# Patient Record
Sex: FEMALE | Race: OTHER | Employment: UNEMPLOYED | URBAN - METROPOLITAN AREA
[De-identification: names, ages, dates, MRNs, and addresses within clinical notes are randomized per-mention and may not be internally consistent; named-entity substitution may affect disease eponyms.]

---

## 2021-06-20 ENCOUNTER — HOSPITAL ENCOUNTER (OUTPATIENT)
Dept: ULTRASOUND IMAGING | Age: 46
Discharge: HOME | End: 2021-06-22
Payer: COMMERCIAL

## 2021-06-20 DIAGNOSIS — R22.9 LOCALIZED SUPERFICIAL SWELLING, MASS, OR LUMP: ICD-10-CM

## 2021-06-20 PROCEDURE — 76999 ECHO EXAMINATION PROCEDURE: CPT

## 2021-07-13 ENCOUNTER — HOSPITAL ENCOUNTER (OUTPATIENT)
Dept: WOMENS IMAGING | Age: 46
Discharge: HOME | End: 2021-07-15
Payer: COMMERCIAL

## 2021-07-13 ENCOUNTER — OFFICE VISIT (OUTPATIENT)
Dept: ORTHOPEDIC SURGERY | Age: 46
End: 2021-07-13
Payer: COMMERCIAL

## 2021-07-13 VITALS
BODY MASS INDEX: 31.58 KG/M2 | TEMPERATURE: 97.5 F | HEART RATE: 77 BPM | OXYGEN SATURATION: 99 % | WEIGHT: 185 LBS | HEIGHT: 64 IN

## 2021-07-13 DIAGNOSIS — Z12.31 ENCOUNTER FOR SCREENING MAMMOGRAM FOR MALIGNANT NEOPLASM OF BREAST: ICD-10-CM

## 2021-07-13 DIAGNOSIS — M67.431 GANGLION CYST OF VOLAR ASPECT OF RIGHT WRIST: Primary | ICD-10-CM

## 2021-07-13 PROCEDURE — 99203 OFFICE O/P NEW LOW 30 MIN: CPT | Performed by: ORTHOPAEDIC SURGERY

## 2021-07-13 PROCEDURE — G8417 CALC BMI ABV UP PARAM F/U: HCPCS | Performed by: ORTHOPAEDIC SURGERY

## 2021-07-13 PROCEDURE — 77067 SCR MAMMO BI INCL CAD: CPT

## 2021-07-13 PROCEDURE — 20612 ASPIRATE/INJ GANGLION CYST: CPT | Performed by: ORTHOPAEDIC SURGERY

## 2021-07-13 PROCEDURE — G8427 DOCREV CUR MEDS BY ELIG CLIN: HCPCS | Performed by: ORTHOPAEDIC SURGERY

## 2021-07-13 PROCEDURE — 4004F PT TOBACCO SCREEN RCVD TLK: CPT | Performed by: ORTHOPAEDIC SURGERY

## 2021-07-13 RX ORDER — MELOXICAM 15 MG/1
TABLET ORAL
COMMUNITY
Start: 2021-07-06

## 2021-07-13 NOTE — PROGRESS NOTES
Subjective:      Patient ID: Carol Holloway is a 42 y.o. female who presents today for:  Chief Complaint   Patient presents with    Ganglion Cyst     NP-ganglion cyst of right wrist present for 4-5 months, painful per patient, pt is right hand dominant, no injury reported; referred by [facility]; no xrays obtained       HPI  Describes the cyst has gotten bigger  And tender feels with her work, she works as a     No past medical history on file. No past surgical history on file. Social History     Socioeconomic History    Marital status: Unknown     Spouse name: Not on file    Number of children: Not on file    Years of education: Not on file    Highest education level: Not on file   Occupational History    Not on file   Tobacco Use    Smoking status: Current Every Day Smoker     Types: Cigarettes    Smokeless tobacco: Never Used   Substance and Sexual Activity    Alcohol use: Not on file    Drug use: Not on file    Sexual activity: Not on file   Other Topics Concern    Not on file   Social History Narrative    Not on file     Social Determinants of Health     Financial Resource Strain:     Difficulty of Paying Living Expenses:    Food Insecurity:     Worried About Running Out of Food in the Last Year:     [address] in the Last Year:    Transportation Needs:     Lack of Transportation (Medical):      Lack of Transportation (Non-Medical):    Physical Activity:     Days of Exercise per Week:     Minutes of Exercise per Session:    Stress:     Feeling of Stress :    Social Connections:     Frequency of Communication with Friends and Family:     Frequency of Social Gatherings with Friends and Family:     Attends Confucianism Services:     Active Member of Clubs or Organizations:     Attends Club or Organization Meetings:     Marital Status:    Intimate Partner Violence:     Fear of Current or Ex-Partner:     Emotionally Abused:     Physically Abused:     Sexually Abused: No family history on file. No Known Allergies  Current Outpatient Medications on File Prior to Visit   Medication Sig Dispense Refill    meloxicam (MOBIC) 15 MG tablet take 1 tablet by mouth once daily       No current facility-administered medications on file prior to visit. Controlled Substance Monitoring:    Acute and Chronic Pain Monitoring:   No flowsheet data found. Review of Systems    Objective:   Pulse 77   Temp 97.5 °F (36.4 °C) (Temporal)   Ht 5' 4\" (1.626 m)   Wt 185 lb (83.9 kg)   LMP 06/20/2021   SpO2 99%   BMI 31.76 kg/m²     Physical Exam:  Right wrist  Swelling present on the volar aspect of the wrist  There is along the radial border and measures approximately 2 cm in length and 1 and half centimeters in width  It is firm to hard in consistency  The radial vessel is ulnar to the cyst  Gera's test is positive for good circulation from the ulnar artery  The cyst is nonpulsatile  Tinel sign is negative wrist motion causes a mild discomfort over the region of the cyst and makes it more prominent on dorsiflexion  No discomfort along the carpal canal area  She has full range of motion of the fingers and the thumb    Diagnostic Imaging:  No x-rays of the wrist were taken      Assessment:       Diagnosis Orders   1.  Ganglion cyst of volar aspect of right wrist  [address]12 - PR ASPIRAT/INJECTION GANGLION CYST(S)         Plan:      Discussed various treatment options for this ganglion cyst  Conservative measures such as aspiration, or observation have been discussed  Surgical excision in the form of exploration and removal of the wrist is also been explained  The risk of injury to the radial artery during aspiration has been discussed and the patient understands this    Procedure  The area of the cyst on the volar aspect of the wrist was prepped well with Betadine  Skin freeze was first applied to the cyst  Using 18-gauge needle approximately 1-1/2 cc of clear gelatinous fluid was aspirated

## 2021-07-13 NOTE — LETTER
[address]  [address] [address] [address]  Phone: [phone]  Fax: [phone]    Norman North MD        July 13, 2021     Patient: Pipe West   YOB: 1975   Date of Visit: 7/13/2021       To Whom it May Concern:    Pipe West was seen in my clinic on 7/13/2021. Please excuse Mariposa Arroyothal off work from 7/13/2021-7/19/2021. Mariposa Arango may return to work on 7/19/2021    If you have any questions or concerns, please don't hesitate to call.     Sincerely,         Norman North MD

## 2021-07-27 ENCOUNTER — HOSPITAL ENCOUNTER (OUTPATIENT)
Dept: ULTRASOUND IMAGING | Age: 46
Discharge: HOME | End: 2021-07-29
Payer: COMMERCIAL

## 2021-07-27 ENCOUNTER — HOSPITAL ENCOUNTER (OUTPATIENT)
Dept: WOMENS IMAGING | Age: 46
Discharge: HOME | End: 2021-07-29
Payer: COMMERCIAL

## 2021-07-27 ENCOUNTER — OFFICE VISIT (OUTPATIENT)
Dept: ORTHOPEDIC SURGERY | Age: 46
End: 2021-07-27
Payer: COMMERCIAL

## 2021-07-27 VITALS
HEIGHT: 64 IN | BODY MASS INDEX: 31.58 KG/M2 | HEART RATE: 73 BPM | WEIGHT: 185 LBS | TEMPERATURE: 97.7 F | OXYGEN SATURATION: 99 %

## 2021-07-27 DIAGNOSIS — R92.8 ABNORMAL MAMMOGRAM: ICD-10-CM

## 2021-07-27 DIAGNOSIS — M65.311 TRIGGER THUMB OF RIGHT HAND: Primary | ICD-10-CM

## 2021-07-27 DIAGNOSIS — M67.431 GANGLION CYST OF VOLAR ASPECT OF RIGHT WRIST: ICD-10-CM

## 2021-07-27 PROCEDURE — 4004F PT TOBACCO SCREEN RCVD TLK: CPT | Performed by: ORTHOPAEDIC SURGERY

## 2021-07-27 PROCEDURE — G8417 CALC BMI ABV UP PARAM F/U: HCPCS | Performed by: ORTHOPAEDIC SURGERY

## 2021-07-27 PROCEDURE — G8427 DOCREV CUR MEDS BY ELIG CLIN: HCPCS | Performed by: ORTHOPAEDIC SURGERY

## 2021-07-27 PROCEDURE — 99213 OFFICE O/P EST LOW 20 MIN: CPT | Performed by: ORTHOPAEDIC SURGERY

## 2021-07-27 PROCEDURE — 76642 ULTRASOUND BREAST LIMITED: CPT

## 2021-07-27 PROCEDURE — 77065 DX MAMMO INCL CAD UNI: CPT

## 2021-07-27 NOTE — PROGRESS NOTES
Subjective:      Patient ID: Ramon Feng is a 42 y.o. female who presents today for:  Chief Complaint   Patient presents with    Follow-up     f/u ganglion cyst of right wrist.  Cyst was drained on 07/13/21 and it came back the next day. Is having pain radiating into the thumb. HPI      No past medical history on file. No past surgical history on file. Social History     Socioeconomic History    Marital status: Unknown     Spouse name: Not on file    Number of children: Not on file    Years of education: Not on file    Highest education level: Not on file   Occupational History    Not on file   Tobacco Use    Smoking status: Current Every Day Smoker     Types: Cigarettes    Smokeless tobacco: Never Used   Substance and Sexual Activity    Alcohol use: Not on file    Drug use: Not on file    Sexual activity: Not on file   Other Topics Concern    Not on file   Social History Narrative    Not on file     Social Determinants of Health     Financial Resource Strain:     Difficulty of Paying Living Expenses:    Food Insecurity:     Worried About Running Out of Food in the Last Year:     [address] in the Last Year:    Transportation Needs:     Lack of Transportation (Medical):  Lack of Transportation (Non-Medical):    Physical Activity:     Days of Exercise per Week:     Minutes of Exercise per Session:    Stress:     Feeling of Stress :    Social Connections:     Frequency of Communication with Friends and Family:     Frequency of Social Gatherings with Friends and Family:     Attends [employer] Services:     Active Member of Clubs or Organizations:     Attends Club or Organization Meetings:     Marital Status:    Intimate Partner Violence:     Fear of Current or Ex-Partner:     Emotionally Abused:     Physically Abused:     Sexually Abused:      No family history on file.   No Known Allergies  Current Outpatient Medications on File Prior to Visit   Medication Sig Dispense Refill    meloxicam (MOBIC) 15 MG tablet take 1 tablet by mouth once daily       No current facility-administered medications on file prior to visit. Controlled Substance Monitoring:    Acute and Chronic Pain Monitoring:   No flowsheet data found. Review of Systems    Objective:   Pulse 73   Temp 97.7 °F (36.5 °C) (Temporal)   Ht 5' 4\" (1.626 m)   Wt 185 lb (83.9 kg)   SpO2 99%   BMI 31.76 kg/m²     Physical Exam:  Right wrist  There is a swelling on the volar aspect of the right wrist close to the distal aspect of the radius  It measures about 1-1/2 to 2 cm in length and 1 cm in width  It is soft in consistency  Tinel sign is negative  The radial artery is palpable along the ulnar aspect of the cyst  Sensations are intact in the hand  Ulnar artery perfusion was checked and noted to be satisfactory    Right thumb  Clear triggering is present in the right thumb  Tenderness over the A1 pulley  Sensations are intact in the thumb  No discomfort in the rest of the A1 pulleys    Diagnostic Imaging:  No rays were taken today      Assessment:       Diagnosis Orders   1. Trigger thumb of right hand     2. Ganglion cyst of volar aspect of right wrist       I explained to the patient the treatment options  Aspiration of the cyst did not help her and the cyst recurred  Surgical intervention was discussed, the surgical procedure was explained in detail  The risks and benefits of surgery was explained  Risk of anesthesia, risk of injury to the vessel, particularly the radial vessel, nerve, tendon.   Risk of injury to the flexor tendon in the digital nerve near the thumb  Possible need for rehab or recurrence of the cyst has also been discussed  Postoperative complications such as hematoma formation, infection, recurrence of the cyst, neuroma formation, hematoma formation, have all been explained  She understands all this and consents for the procedure  This will be done in approximately 2 weeks        Plan: Surgery Phone: [facility]   Surgery Fax: [phone]    Phone: [phone]          Fax: [phone]    Orthopedics: Surgery Scheduling, PAT & PRE-OP Order Form  Call to advance [place] at [phone] at least 24 hours prior to date of service     Surgery Location: [facility] Surgery: Σκαφίδια Tiffany Eid MD Surgery Date: 8/10/21  Time: A. m.am    Patient's Name: Amy Baker : 1975    SS#:  [id number]  Gender: female  Home Phone:  [phone] Cell Phone: [phone]  Emergency Contact:  *No Contact Specified*   Phone:   Payor: Ronni Garay /  /  /    ID No.: [id number]      PROVIDER TO COMPLETE:  Diagnosis: Right hand ganglion cyst, right trigger thumb  Procedure/Consent: Excision of ganglion cyst right wrist, release of trigger thumb right  CPT CODES: Trigger thumb right hand, ganglion cyst right wrist  Case Comments/Implants: N/A   Surgery Scheduled as: Outpatient  Anesthesia Requested: General  Referring Family Doctor: Armando Corona DO   PAT  [x] [address] [facility] Date/Time:                                                            [x] History & Physical [] Physician will Provide [] Attached [] Dictated [] Other  [x] Follow Anesthesia Pre-Op Orders for X-rays, [facility]     [x] SN & PT to evaluate and treat/educate disease management, medications, home safety & equipment needs for total joint patients  [] Other: ____________________________________________________  Consults: Medical/Cardiac Clearance done by  ____________________  PRE-OP ORDERS:   Allergies: Patient has no known allergies.  Latex Allergies:             Diabetic:           [] IV ________________________  [x] IV Start with J-loop     Preprinted Orders: Attached [] Yes [] No   ANTIBIOTIC PRE-OP: [x] ANCEF 2 gram IVPB if > 120 kg 3 grams IVPB within 1 hour of incision, if ALLERGIC, use VANCOMYCIN 1 gram IV, 2 hours pre-op  [] TXA Protocol [] Other:   [x] NPO   [] Betablocker (if needed) _____________________________________   [] Knee high anti-embolic hose [] Thigh high anti-embolic hose   Other: ______________________________________________________    Physician Signature Required: Norman North MD   Date/Time: 7/27/2021      No orders of the defined types were placed in this encounter. No orders of the defined types were placed in this encounter. No follow-ups on file.       Norman North MD

## 2021-07-30 ENCOUNTER — TELEPHONE (OUTPATIENT)
Dept: ORTHOPEDIC SURGERY | Age: 46
End: 2021-07-30

## 2021-07-30 NOTE — TELEPHONE ENCOUNTER
Shayne Jean-Baptiste has initiated a prior authorization through patients insurance Payor: Jerson Church /  /  /  for surgery with  on 8/10/2021. [employer] has stated No authorization needed for CPT codes 09957,69897.     Surgery sheet faxed to surgery scheduling, provider calendar updated

## 2021-08-03 ENCOUNTER — TELEPHONE (OUTPATIENT)
Dept: ORTHOPEDIC SURGERY | Age: 46
End: 2021-08-03

## 2021-08-03 NOTE — TELEPHONE ENCOUNTER
Padmini HINKLE  Insurance stated no prior authorization is required for surgery with Dr. Bob Beck on 08/10/2021             Insurance approved cpt code 94284,[id number]  REF # [id number]

## 2021-08-09 ENCOUNTER — OFFICE VISIT (OUTPATIENT)
Dept: ORTHOPEDIC SURGERY | Age: 46
End: 2021-08-09
Payer: COMMERCIAL

## 2021-08-09 VITALS
HEART RATE: 75 BPM | DIASTOLIC BLOOD PRESSURE: 89 MMHG | RESPIRATION RATE: 16 BRPM | BODY MASS INDEX: 31.58 KG/M2 | SYSTOLIC BLOOD PRESSURE: 123 MMHG | WEIGHT: 185 LBS | TEMPERATURE: 97.3 F | HEIGHT: 64 IN | OXYGEN SATURATION: 97 %

## 2021-08-09 DIAGNOSIS — Z01.818 PRE-OP TESTING: Primary | ICD-10-CM

## 2021-08-09 PROCEDURE — 99406 BEHAV CHNG SMOKING 3-10 MIN: CPT | Performed by: PHYSICIAN ASSISTANT

## 2021-08-09 PROCEDURE — 4004F PT TOBACCO SCREEN RCVD TLK: CPT | Performed by: PHYSICIAN ASSISTANT

## 2021-08-09 PROCEDURE — G8417 CALC BMI ABV UP PARAM F/U: HCPCS | Performed by: PHYSICIAN ASSISTANT

## 2021-08-09 PROCEDURE — 99213 OFFICE O/P EST LOW 20 MIN: CPT | Performed by: PHYSICIAN ASSISTANT

## 2021-08-09 PROCEDURE — G8427 DOCREV CUR MEDS BY ELIG CLIN: HCPCS | Performed by: PHYSICIAN ASSISTANT

## 2021-08-09 NOTE — LETTER
[address] [address]  Phone: [phone]  Fax: [phone]    Lucero Colon PA-C        August 9, 2021     Patient: Mary Watters   YOB: 1975   Date of Visit: 8/9/2021       To Whom It May Concern: It is my medical opinion that Mary Watters should be off work until 8/30. If you have any questions or concerns, please don't hesitate to call.     Sincerely,          Lucero Colon PA-C

## 2021-08-09 NOTE — PROGRESS NOTES
[place] [address] and Sports Medicine    H&P: Preadmission Testing     Patient: Farideh Brody  YOB: 1975  MRN: [id number]    Subjective:     Chief Complaint   Patient presents with    Pre-op Exam     Pt is schedueld for EXCISION OF GANGLION CYST RIGHT WRIST, EXCISION OF GANGLION CYST RIGHT WRIST, - Right with Selina Berger MD on 8/10/2021       HPI: Farideh Brody is a 42 y.o. female w/ no pertinent PMHx  Here for preop evaluation for cyst removal at the right wrist as well as trigger at the right thumb. She has known history of heart disease. No chest pain palpitations shortness of breath difficulty with exercise. No lung disease. She is a smoker, we talked about the negative consequences of that both long-term and short-term for 4 minutes. She has no wheezing. She has never had Covid, she is fully vaccinated. She has no diabetes. Standing blood thinners. No kidney disease. She is a very healthy 29-year-old decently English-speaking woman  Past Medical History:    No past medical history on file. Past Surgical History:    No past surgical history on file. Medications Prior to Admission:    Current Outpatient Medications   Medication Sig Dispense Refill    meloxicam (MOBIC) 15 MG tablet take 1 tablet by mouth once daily (Patient not taking: Reported on 8/9/2021)       No current facility-administered medications for this visit. Allergies:    Patient has no known allergies.     Social History:   Social History     Socioeconomic History    Marital status: Unknown     Spouse name: Not on file    Number of children: Not on file    Years of education: Not on file    Highest education level: Not on file   Occupational History    Not on file   Tobacco Use    Smoking status: Current Every Day Smoker     Types: Cigarettes    Smokeless tobacco: Never Used   Substance and Sexual Activity    Alcohol use: Not on file    Drug use: Not on file    Sexual activity: Not on file   Other Topics Concern    Not on file   Social History Narrative    Not on file     Social Determinants of Health     Financial Resource Strain:     Difficulty of Paying Living Expenses:    Food Insecurity:     Worried About Running Out of Food in the Last Year:     [address] in the Last Year:    Transportation Needs:     Lack of Transportation (Medical):  Lack of Transportation (Non-Medical):    Physical Activity:     Days of Exercise per Week:     Minutes of Exercise per Session:    Stress:     Feeling of Stress :    Social Connections:     Frequency of Communication with Friends and Family:     Frequency of Social Gatherings with Friends and Family:     Attends [employer] Services:     Active Member of Clubs or Organizations:     Attends Club or Organization Meetings:     Marital Status:    Intimate Partner Violence:     Fear of Current or Ex-Partner:     Emotionally Abused:     Physically Abused:     Sexually Abused:        Family History:   No family history on file. Objective:   /89 (Site: Right Upper Arm, Position: Sitting, Cuff Size: Medium Adult)   Pulse 75   Temp 97.3 °F (36.3 °C) (Temporal)   Resp 16   Ht 5' 4\" (1.626 m)   Wt 185 lb (83.9 kg)   SpO2 97%   BMI 31.76 kg/m²     Physical Exam  Constitutional:       General: She is not in acute distress. Appearance: Normal appearance. She is not ill-appearing. HENT:      Head: Normocephalic. Nose: Nose normal. No congestion or rhinorrhea. Mouth/Throat:      Mouth: Mucous membranes are moist.      Pharynx: Oropharynx is clear. No oropharyngeal exudate or posterior oropharyngeal erythema. Eyes:      Extraocular Movements: Extraocular movements intact. Pupils: Pupils are equal, round, and reactive to light. Cardiovascular:      Rate and Rhythm: Normal rate and regular rhythm. Pulses: Normal pulses. Heart sounds: Normal heart sounds.    Pulmonary: Effort: Pulmonary effort is normal.      Breath sounds: Normal breath sounds. No wheezing, rhonchi or rales. Abdominal:      General: Abdomen is flat. Bowel sounds are normal.      Palpations: Abdomen is soft. Tenderness: There is no abdominal tenderness. Skin:     General: Skin is warm and dry. Capillary Refill: Capillary refill takes less than 2 seconds. Comments: No swelling or erythema over the incision site   Neurological:      General: No focal deficit present. Mental Status: She is alert and oriented to person, place, and time. Radiographs and Laboratory Studies:     Laboratory Studies:   No results found for: WBC, HGB, HCT, MCV, PLT  No results found for: SEDRATE  No results found for: CRP    Assessment and Plan:      Diagnosis Orders   1. Pre-op testing  CBC    Basic Metabolic Panel       Fully vaccinated  Blood work was ordered and will be reviewed. I'll see them back 2 weeks postoperatively.     Becky Orlando PA-C  [address] and Sports Medicine  [phone]

## 2021-08-10 ENCOUNTER — ANESTHESIA (OUTPATIENT)
Dept: OPERATING ROOM | Age: 46
End: 2021-08-10
Payer: COMMERCIAL

## 2021-08-10 ENCOUNTER — HOSPITAL ENCOUNTER (OUTPATIENT)
Age: 46
Setting detail: OUTPATIENT SURGERY
Discharge: HOME | End: 2021-08-10
Attending: ORTHOPAEDIC SURGERY | Admitting: ORTHOPAEDIC SURGERY
Payer: COMMERCIAL

## 2021-08-10 ENCOUNTER — ANESTHESIA EVENT (OUTPATIENT)
Dept: OPERATING ROOM | Age: 46
End: 2021-08-10
Payer: COMMERCIAL

## 2021-08-10 VITALS
RESPIRATION RATE: 12 BRPM | OXYGEN SATURATION: 98 % | HEIGHT: 64 IN | TEMPERATURE: 97.3 F | HEART RATE: 68 BPM | WEIGHT: 186 LBS | SYSTOLIC BLOOD PRESSURE: 120 MMHG | DIASTOLIC BLOOD PRESSURE: 77 MMHG | BODY MASS INDEX: 31.76 KG/M2

## 2021-08-10 VITALS — DIASTOLIC BLOOD PRESSURE: 55 MMHG | OXYGEN SATURATION: 96 % | TEMPERATURE: 32.2 F | SYSTOLIC BLOOD PRESSURE: 100 MMHG

## 2021-08-10 PROCEDURE — 2709999900 HC NON-CHARGEABLE SUPPLY: Performed by: ORTHOPAEDIC SURGERY

## 2021-08-10 PROCEDURE — 7100000011 HC PHASE II RECOVERY - ADDTL 15 MIN: Performed by: ORTHOPAEDIC SURGERY

## 2021-08-10 PROCEDURE — 3600000003 HC SURGERY LEVEL 3 BASE: Performed by: ORTHOPAEDIC SURGERY

## 2021-08-10 PROCEDURE — 6360000002 HC RX W HCPCS: Performed by: NURSE ANESTHETIST, CERTIFIED REGISTERED

## 2021-08-10 PROCEDURE — 6370000000 HC RX 637 (ALT 250 FOR IP): Performed by: ANESTHESIOLOGY

## 2021-08-10 PROCEDURE — 6360000002 HC RX W HCPCS: Performed by: ORTHOPAEDIC SURGERY

## 2021-08-10 PROCEDURE — 7100000000 HC PACU RECOVERY - FIRST 15 MIN: Performed by: ORTHOPAEDIC SURGERY

## 2021-08-10 PROCEDURE — 7100000001 HC PACU RECOVERY - ADDTL 15 MIN: Performed by: ORTHOPAEDIC SURGERY

## 2021-08-10 PROCEDURE — 2500000003 HC RX 250 WO HCPCS: Performed by: ORTHOPAEDIC SURGERY

## 2021-08-10 PROCEDURE — 2580000003 HC RX 258

## 2021-08-10 PROCEDURE — 7100000010 HC PHASE II RECOVERY - FIRST 15 MIN: Performed by: ORTHOPAEDIC SURGERY

## 2021-08-10 PROCEDURE — 88304 TISSUE EXAM BY PATHOLOGIST: CPT

## 2021-08-10 PROCEDURE — 2580000003 HC RX 258: Performed by: NURSE ANESTHETIST, CERTIFIED REGISTERED

## 2021-08-10 PROCEDURE — 25111 REMOVE WRIST TENDON LESION: CPT | Performed by: ORTHOPAEDIC SURGERY

## 2021-08-10 PROCEDURE — 3600000013 HC SURGERY LEVEL 3 ADDTL 15MIN: Performed by: ORTHOPAEDIC SURGERY

## 2021-08-10 PROCEDURE — 3700000000 HC ANESTHESIA ATTENDED CARE: Performed by: ORTHOPAEDIC SURGERY

## 2021-08-10 PROCEDURE — 26055 INCISE FINGER TENDON SHEATH: CPT | Performed by: ORTHOPAEDIC SURGERY

## 2021-08-10 PROCEDURE — 2580000003 HC RX 258: Performed by: ORTHOPAEDIC SURGERY

## 2021-08-10 PROCEDURE — 3700000001 HC ADD 15 MINUTES (ANESTHESIA): Performed by: ORTHOPAEDIC SURGERY

## 2021-08-10 RX ORDER — SODIUM CHLORIDE, SODIUM LACTATE, POTASSIUM CHLORIDE, CALCIUM CHLORIDE 600; 310; 30; 20 MG/100ML; MG/100ML; MG/100ML; MG/100ML
INJECTION, SOLUTION INTRAVENOUS
Status: COMPLETED
Start: 2021-08-10 | End: 2021-08-10

## 2021-08-10 RX ORDER — SODIUM CHLORIDE, SODIUM LACTATE, POTASSIUM CHLORIDE, CALCIUM CHLORIDE 600; 310; 30; 20 MG/100ML; MG/100ML; MG/100ML; MG/100ML
INJECTION, SOLUTION INTRAVENOUS CONTINUOUS PRN
Status: DISCONTINUED | OUTPATIENT
Start: 2021-08-10 | End: 2021-08-10 | Stop reason: SDUPTHER

## 2021-08-10 RX ORDER — SODIUM CHLORIDE 9 MG/ML
25 INJECTION, SOLUTION INTRAVENOUS PRN
Status: DISCONTINUED | OUTPATIENT
Start: 2021-08-10 | End: 2021-08-10 | Stop reason: HOSPADM

## 2021-08-10 RX ORDER — ONDANSETRON 2 MG/ML
INJECTION INTRAMUSCULAR; INTRAVENOUS PRN
Status: DISCONTINUED | OUTPATIENT
Start: 2021-08-10 | End: 2021-08-10 | Stop reason: SDUPTHER

## 2021-08-10 RX ORDER — BUPIVACAINE HYDROCHLORIDE 2.5 MG/ML
INJECTION, SOLUTION EPIDURAL; INFILTRATION; INTRACAUDAL PRN
Status: DISCONTINUED | OUTPATIENT
Start: 2021-08-10 | End: 2021-08-10 | Stop reason: ALTCHOICE

## 2021-08-10 RX ORDER — DEXAMETHASONE SODIUM PHOSPHATE 4 MG/ML
INJECTION, SOLUTION INTRA-ARTICULAR; INTRALESIONAL; INTRAMUSCULAR; INTRAVENOUS; SOFT TISSUE PRN
Status: DISCONTINUED | OUTPATIENT
Start: 2021-08-10 | End: 2021-08-10 | Stop reason: SDUPTHER

## 2021-08-10 RX ORDER — METOCLOPRAMIDE HYDROCHLORIDE 5 MG/ML
10 INJECTION INTRAMUSCULAR; INTRAVENOUS
Status: DISCONTINUED | OUTPATIENT
Start: 2021-08-10 | End: 2021-08-10 | Stop reason: HOSPADM

## 2021-08-10 RX ORDER — LIDOCAINE HYDROCHLORIDE 10 MG/ML
1 INJECTION, SOLUTION EPIDURAL; INFILTRATION; INTRACAUDAL; PERINEURAL
Status: DISCONTINUED | OUTPATIENT
Start: 2021-08-10 | End: 2021-08-10 | Stop reason: HOSPADM

## 2021-08-10 RX ORDER — SODIUM CHLORIDE 0.9 % (FLUSH) 0.9 %
10 SYRINGE (ML) INJECTION PRN
Status: DISCONTINUED | OUTPATIENT
Start: 2021-08-10 | End: 2021-08-10 | Stop reason: HOSPADM

## 2021-08-10 RX ORDER — FENTANYL CITRATE 50 UG/ML
50 INJECTION, SOLUTION INTRAMUSCULAR; INTRAVENOUS EVERY 10 MIN PRN
Status: DISCONTINUED | OUTPATIENT
Start: 2021-08-10 | End: 2021-08-10 | Stop reason: HOSPADM

## 2021-08-10 RX ORDER — PROPOFOL 10 MG/ML
INJECTION, EMULSION INTRAVENOUS PRN
Status: DISCONTINUED | OUTPATIENT
Start: 2021-08-10 | End: 2021-08-10 | Stop reason: SDUPTHER

## 2021-08-10 RX ORDER — MEPERIDINE HYDROCHLORIDE 25 MG/ML
12.5 INJECTION INTRAMUSCULAR; INTRAVENOUS; SUBCUTANEOUS EVERY 5 MIN PRN
Status: DISCONTINUED | OUTPATIENT
Start: 2021-08-10 | End: 2021-08-10 | Stop reason: HOSPADM

## 2021-08-10 RX ORDER — LIDOCAINE HYDROCHLORIDE 20 MG/ML
INJECTION, SOLUTION INTRAVENOUS PRN
Status: DISCONTINUED | OUTPATIENT
Start: 2021-08-10 | End: 2021-08-10 | Stop reason: SDUPTHER

## 2021-08-10 RX ORDER — HYDROCODONE BITARTRATE AND ACETAMINOPHEN 5; 325 MG/1; MG/1
1 TABLET ORAL PRN
Status: COMPLETED | OUTPATIENT
Start: 2021-08-10 | End: 2021-08-10

## 2021-08-10 RX ORDER — FENTANYL CITRATE 50 UG/ML
INJECTION, SOLUTION INTRAMUSCULAR; INTRAVENOUS PRN
Status: DISCONTINUED | OUTPATIENT
Start: 2021-08-10 | End: 2021-08-10 | Stop reason: SDUPTHER

## 2021-08-10 RX ORDER — MIDAZOLAM HYDROCHLORIDE 2 MG/2ML
INJECTION, SOLUTION INTRAMUSCULAR; INTRAVENOUS PRN
Status: DISCONTINUED | OUTPATIENT
Start: 2021-08-10 | End: 2021-08-10 | Stop reason: SDUPTHER

## 2021-08-10 RX ORDER — ONDANSETRON 2 MG/ML
4 INJECTION INTRAMUSCULAR; INTRAVENOUS
Status: DISCONTINUED | OUTPATIENT
Start: 2021-08-10 | End: 2021-08-10 | Stop reason: HOSPADM

## 2021-08-10 RX ORDER — SODIUM CHLORIDE, SODIUM LACTATE, POTASSIUM CHLORIDE, CALCIUM CHLORIDE 600; 310; 30; 20 MG/100ML; MG/100ML; MG/100ML; MG/100ML
INJECTION, SOLUTION INTRAVENOUS CONTINUOUS
Status: DISCONTINUED | OUTPATIENT
Start: 2021-08-10 | End: 2021-08-10 | Stop reason: HOSPADM

## 2021-08-10 RX ORDER — MAGNESIUM HYDROXIDE 1200 MG/15ML
LIQUID ORAL CONTINUOUS PRN
Status: COMPLETED | OUTPATIENT
Start: 2021-08-10 | End: 2021-08-10

## 2021-08-10 RX ORDER — SODIUM CHLORIDE 0.9 % (FLUSH) 0.9 %
10 SYRINGE (ML) INJECTION EVERY 12 HOURS SCHEDULED
Status: DISCONTINUED | OUTPATIENT
Start: 2021-08-10 | End: 2021-08-10 | Stop reason: HOSPADM

## 2021-08-10 RX ORDER — HYDROCODONE BITARTRATE AND ACETAMINOPHEN 5; 325 MG/1; MG/1
2 TABLET ORAL PRN
Status: COMPLETED | OUTPATIENT
Start: 2021-08-10 | End: 2021-08-10

## 2021-08-10 RX ORDER — DIPHENHYDRAMINE HYDROCHLORIDE 50 MG/ML
12.5 INJECTION INTRAMUSCULAR; INTRAVENOUS
Status: DISCONTINUED | OUTPATIENT
Start: 2021-08-10 | End: 2021-08-10 | Stop reason: HOSPADM

## 2021-08-10 RX ADMIN — DEXAMETHASONE SODIUM PHOSPHATE 4 MG: 4 INJECTION, SOLUTION INTRAMUSCULAR; INTRAVENOUS at 10:54

## 2021-08-10 RX ADMIN — MIDAZOLAM HYDROCHLORIDE 2 MG: 1 INJECTION, SOLUTION INTRAMUSCULAR; INTRAVENOUS at 10:31

## 2021-08-10 RX ADMIN — HYDROCODONE BITARTRATE AND ACETAMINOPHEN 2 TABLET: 5; 325 TABLET ORAL at 12:30

## 2021-08-10 RX ADMIN — SODIUM CHLORIDE, POTASSIUM CHLORIDE, SODIUM LACTATE AND CALCIUM CHLORIDE: 600; 310; 30; 20 INJECTION, SOLUTION INTRAVENOUS at 10:31

## 2021-08-10 RX ADMIN — LIDOCAINE HYDROCHLORIDE 50 MG: 20 INJECTION, SOLUTION INTRAVENOUS at 10:35

## 2021-08-10 RX ADMIN — FENTANYL CITRATE 100 MCG: 50 INJECTION, SOLUTION INTRAMUSCULAR; INTRAVENOUS at 10:35

## 2021-08-10 RX ADMIN — CEFAZOLIN 2000 MG: 10 INJECTION, POWDER, FOR SOLUTION INTRAVENOUS at 10:37

## 2021-08-10 RX ADMIN — ONDANSETRON 4 MG: 2 INJECTION INTRAMUSCULAR; INTRAVENOUS at 10:54

## 2021-08-10 RX ADMIN — SODIUM CHLORIDE, POTASSIUM CHLORIDE, SODIUM LACTATE AND CALCIUM CHLORIDE 1000 ML: 600; 310; 30; 20 INJECTION, SOLUTION INTRAVENOUS at 09:36

## 2021-08-10 RX ADMIN — PROPOFOL 200 MG: 10 INJECTION, EMULSION INTRAVENOUS at 10:35

## 2021-08-10 ASSESSMENT — PULMONARY FUNCTION TESTS
PIF_VALUE: 20
PIF_VALUE: 3
PIF_VALUE: 2
PIF_VALUE: 0
PIF_VALUE: 2
PIF_VALUE: 3
PIF_VALUE: 2
PIF_VALUE: 2
PIF_VALUE: 3
PIF_VALUE: 1
PIF_VALUE: 3
PIF_VALUE: 2
PIF_VALUE: 3
PIF_VALUE: 2
PIF_VALUE: 10
PIF_VALUE: 2
PIF_VALUE: 3
PIF_VALUE: 2
PIF_VALUE: 3
PIF_VALUE: 2
PIF_VALUE: 2
PIF_VALUE: 3
PIF_VALUE: 3
PIF_VALUE: 2
PIF_VALUE: 3
PIF_VALUE: 0
PIF_VALUE: 10
PIF_VALUE: 2
PIF_VALUE: 1
PIF_VALUE: 3
PIF_VALUE: 2
PIF_VALUE: 3
PIF_VALUE: 3
PIF_VALUE: 20
PIF_VALUE: 1
PIF_VALUE: 2
PIF_VALUE: 3
PIF_VALUE: 2
PIF_VALUE: 2

## 2021-08-10 ASSESSMENT — PAIN SCALES - GENERAL
PAINLEVEL_OUTOF10: 6
PAINLEVEL_OUTOF10: 5

## 2021-08-10 ASSESSMENT — LIFESTYLE VARIABLES: SMOKING_STATUS: 1

## 2021-08-10 NOTE — OP NOTE
understands all the risks and benefits and consents for the surgical procedure. She clearly understands that the ganglion cyst could recur again in the same area or in a different area. Ganglion cyst once excised will be sent for histological analysis. Operative note    Patient was brought to the operating room and general anesthesia was induced paresthetic services. Patient was positioned supine. All bony prominences well-padded. The patient had 2 g of Ancef given prior to the start of the procedure. Right upper extremity was prepped and draped in the usual sterile fashion. With chlorhexidine tourniquet was applied and elevated to approximately 180 mmHg. A timeout was called prior to the start of the procedure. The surgical procedure was done with magnifying loops    The trigger thumb was examined. A transverse incision was made over the level of the A1 pulley. Skin and subtenons tissue were carefully cut and retracted. Care was taken to avoid any damage to the subcutaneous tissue. The digital nerves were clearly moved out of the way. The procedure was done with magnifying loops. The A1 pulley was identified. And a longitudinal incision was made in the A1 pulley along the direction of the FPL tendon. This was done with a 15 blade. Using blunt tipped scissors A1 pulley was resected and released both proximally and distally. The FPL tendon was moving freely following the release. There was no triggering noted. The volar aspect of the wrist along the distal aspect of the radius was carefully examined. The course of the radial artery was carefully marked out. And this was going ulnar to the ganglion cyst.  A longitudinal incision was made along the course of the ganglion cyst.  Skin and subtenons tissue were carefully cut and retracted. Blunt dissection was done the bluish presence of a ganglion cyst was identified.   The radial artery and along with its vessels were present along the ulnar aspect of the ganglion cyst on the wrist was carefully isolated and retracted using blunt tipped scissors and a nontoothed forceps after the radial vessels were carefully moved out of the way, the ganglion cyst was carefully resected. The contents of the ganglion cyst leaked out during this procedure and this was a clear gelatinous fluid, that usually accompanies a ganglion cyst.  The cyst was carefully teased out from the underlying structures. And care was taken to avoid any damage to tenderness and subcutaneous nerves. The radial vessels being held well out of the way. The cyst was removed in its entirety. A small stalk which was going deep into the soft tissues was carefully isolated and cauterized. The entire cyst was removed. And this was sent for histological analysis. The radial vessels had been isolated completely and were noted to be continuous and intact. There was no damage to the radial vessels noted at the completion of the cyst excision. Surgical site was irrigated out once again the skin was closed Monocryl and the skin over these trigger thumb was closed with nylon. A volar splint was applied. The patient made a satisfactory recovery. She will be evaluated in orthopedic office in approximately 10 days. Instructions will be provided for her postoperative care.           Electronically signed by Shyann Graff MD on 8/10/2021 at 11:16 AM

## 2021-08-10 NOTE — ANESTHESIA POSTPROCEDURE EVALUATION
Department of Anesthesiology  Postprocedure Note    Patient: Mami Rand  MRN: [id number]  YOB: 1975  Date of evaluation: 8/10/2021  Time:  11:31 AM     Procedure Summary     Date: 08/10/21 Room / Location: [address]    Anesthesia Start: 1031 Anesthesia Stop: 0014    Procedures:       EXCISION OF GANGLION CYST RIGHT WRIST, (Right )      RELEASE OF TRIGGER THUMB RIGHT , PAT WITH ELI IN OFFICE (Right ) Diagnosis: (RIGHT HAND GANGLION CYST RIGHT TRIGGER THUMB)    Surgeons: Emani Castañeda MD Responsible Provider: Shaw Magana MD    Anesthesia Type: general ASA Status: 2          Anesthesia Type: general    Kylah Phase I: Kylah Score: 10    Kylah Phase II:      Last vitals: Reviewed and per EMR flowsheets.        Anesthesia Post Evaluation    Patient location during evaluation: PACU  Patient participation: complete - patient participated  Level of consciousness: responsive to verbal stimuli  Pain score: 0  Airway patency: patent  Nausea & Vomiting: no nausea and no vomiting  Complications: no  Cardiovascular status: blood pressure returned to baseline and hemodynamically stable  Respiratory status: acceptable and nasal cannula  Hydration status: stable

## 2021-08-10 NOTE — ANESTHESIA PRE PROCEDURE
Department of Anesthesiology  Preprocedure Note       Name:  Moi Mean   Age:  42 y.o.  :  1975                                          MRN:  [id number]         Date:  8/10/2021      Surgeon: Jessica Muniz): Frank Ortiz MD    Procedure: Procedure(s):  EXCISION OF GANGLION CYST RIGHT WRIST,  RELEASE OF TRIGGER THUMB RIGHT , PAT WITH ELI IN OFFICE    Medications prior to admission:   Prior to Admission medications    Medication Sig Start Date End Date Taking? Authorizing Provider   meloxicam (MOBIC) 15 MG tablet take 1 tablet by mouth once daily 21  Yes Historical Provider, MD       Current medications:    Current Facility-Administered Medications   Medication Dose Route Frequency Provider Last Rate Last Admin    ceFAZolin (ANCEF) 2000 mg in dextrose 5 % 100 mL IVPB  2,000 mg Intravenous On Call to [address] [facility], MD        lactated ringers infusion                Allergies:  No Known Allergies    Problem List:  There is no problem list on file for this patient. Past Medical History:  No past medical history on file. Past Surgical History:  No past surgical history on file. Social History:    Social History     Tobacco Use    Smoking status: Current Every Day Smoker     Types: Cigarettes    Smokeless tobacco: Never Used   Substance Use Topics    Alcohol use: Not on file                                Ready to quit: Not Answered  Counseling given: Not Answered      Vital Signs (Current): There were no vitals filed for this visit.                                            BP Readings from Last 3 Encounters:   21 123/89       NPO Status: Time of last liquid consumption: 2200                        Time of last solid consumption: 0                        Date of last liquid consumption: 21                        Date of last solid food consumption: 21    BMI:   Wt Readings from Last 3 Encounters:   21 185 lb (83.9 kg)   21 185 lb (83.9 kg)   07/13/21 185 lb (83.9 kg)     There is no height or weight on file to calculate BMI.    CBC:   Lab Results   Component Value Date    WBC 6.1 08/09/2021    RBC 4.46 08/09/2021    HGB 14.5 08/09/2021    HCT 42.7 08/09/2021    MCV 95.8 08/09/2021    RDW 13.6 08/09/2021     08/09/2021       CMP:   Lab Results   Component Value Date     08/09/2021    K 4.5 08/09/2021     08/09/2021    CO2 28 08/09/2021    BUN 10 08/09/2021    CREATININE 0.59 08/09/2021    GFRAA >60.0 08/09/2021    LABGLOM >60.0 08/09/2021    GLUCOSE 137 08/09/2021    CALCIUM 8.8 08/09/2021       POC Tests: No results for input(s): POCGLU, POCNA, POCK, POCCL, POCBUN, POCHEMO, POCHCT in the last 72 hours. Coags: No results found for: PROTIME, INR, APTT    HCG (If Applicable):   Lab Results   Component Value Date    PREGTESTUR Negative 08/09/2021        ABGs: No results found for: PHART, PO2ART, ZPL0BBF, QOP2UUK, BEART, H7HVRWFX     Type & Screen (If Applicable):  No results found for: LABABO, LABRH    Drug/Infectious Status (If Applicable):  No results found for: HIV, HEPCAB    COVID-19 Screening (If Applicable): No results found for: COVID19        Anesthesia Evaluation  Patient summary reviewed and Nursing notes reviewed no history of anesthetic complications:   Airway: Mallampati: II  TM distance: >3 FB   Neck ROM: full  Mouth opening: > = 3 FB Dental: normal exam         Pulmonary:   (+) current smoker                           Cardiovascular:Negative CV ROS             Beta Blocker:  Not on Beta Blocker         Neuro/Psych:   Negative Neuro/Psych ROS              GI/Hepatic/Renal:   (+) morbid obesity (class I obesity)          Endo/Other: Negative Endo/Other ROS                    Abdominal:             Vascular: negative vascular ROS. Other Findings:             Anesthesia Plan      general     ASA 2       Induction: intravenous.     MIPS: Postoperative opioids intended and Prophylactic antiemetics administered. Anesthetic plan and risks discussed with patient. Plan discussed with CRNA.     Attending anesthesiologist reviewed and agrees with Josse Wong MD   8/10/2021

## 2021-08-10 NOTE — PROGRESS NOTES
1130-She came into the pacu with an oral airway in place and that was removed upon arrival.  IV infusing into the left hand with vero-400

## 2021-08-17 ENCOUNTER — OFFICE VISIT (OUTPATIENT)
Dept: FAMILY MEDICINE CLINIC | Age: 46
End: 2021-08-17
Payer: COMMERCIAL

## 2021-08-17 VITALS
WEIGHT: 186 LBS | HEART RATE: 83 BPM | OXYGEN SATURATION: 96 % | SYSTOLIC BLOOD PRESSURE: 118 MMHG | DIASTOLIC BLOOD PRESSURE: 76 MMHG | HEIGHT: 64 IN | TEMPERATURE: 97.6 F | BODY MASS INDEX: 31.76 KG/M2

## 2021-08-17 DIAGNOSIS — Z01.818 PRE-OP EXAMINATION: Primary | ICD-10-CM

## 2021-08-17 PROCEDURE — G8417 CALC BMI ABV UP PARAM F/U: HCPCS | Performed by: PHYSICIAN ASSISTANT

## 2021-08-17 PROCEDURE — G8427 DOCREV CUR MEDS BY ELIG CLIN: HCPCS | Performed by: PHYSICIAN ASSISTANT

## 2021-08-17 PROCEDURE — 99243 OFF/OP CNSLTJ NEW/EST LOW 30: CPT | Performed by: PHYSICIAN ASSISTANT

## 2021-08-17 SDOH — ECONOMIC STABILITY: FOOD INSECURITY: WITHIN THE PAST 12 MONTHS, THE FOOD YOU BOUGHT JUST DIDN'T LAST AND YOU DIDN'T HAVE MONEY TO GET MORE.: NEVER TRUE

## 2021-08-17 SDOH — ECONOMIC STABILITY: FOOD INSECURITY: WITHIN THE PAST 12 MONTHS, YOU WORRIED THAT YOUR FOOD WOULD RUN OUT BEFORE YOU GOT MONEY TO BUY MORE.: NEVER TRUE

## 2021-08-17 ASSESSMENT — PATIENT HEALTH QUESTIONNAIRE - PHQ9
SUM OF ALL RESPONSES TO PHQ QUESTIONS 1-9: 0
SUM OF ALL RESPONSES TO PHQ9 QUESTIONS 1 & 2: 0
1. LITTLE INTEREST OR PLEASURE IN DOING THINGS: 0
SUM OF ALL RESPONSES TO PHQ QUESTIONS 1-9: 0
SUM OF ALL RESPONSES TO PHQ9 QUESTIONS 1 & 2: 0
SUM OF ALL RESPONSES TO PHQ QUESTIONS 1-9: 0
1. LITTLE INTEREST OR PLEASURE IN DOING THINGS: 0
2. FEELING DOWN, DEPRESSED OR HOPELESS: 0
2. FEELING DOWN, DEPRESSED OR HOPELESS: 0
SUM OF ALL RESPONSES TO PHQ QUESTIONS 1-9: 0

## 2021-08-17 ASSESSMENT — SOCIAL DETERMINANTS OF HEALTH (SDOH): HOW HARD IS IT FOR YOU TO PAY FOR THE VERY BASICS LIKE FOOD, HOUSING, MEDICAL CARE, AND HEATING?: NOT HARD AT ALL

## 2021-08-17 NOTE — PROGRESS NOTES
Preoperative Consultation      Paul Woody  YOB: 1975    Date of Service:  8/17/2021    Vitals:    08/17/21 1005   BP: 118/76   Site: Right Upper Arm   Position: Sitting   Cuff Size: Large Adult   Pulse: 83   Temp: 97.6 °F (36.4 °C)   TempSrc: Temporal   SpO2: 96%   Weight: 186 lb (84.4 kg)   Height: 5' 4\" (1.626 m)      Wt Readings from Last 2 Encounters:   08/17/21 186 lb (84.4 kg)   08/10/21 186 lb (84.4 kg)     BP Readings from Last 3 Encounters:   08/17/21 118/76   08/10/21 120/77   08/10/21 (!) 100/55        Chief Complaint   Patient presents with   [facility] Pre-op Exam     Patient here for pre op for DR. Spence 72 ELECTROSURGICAL EXCISION PROCEDURE ENDOCERVICAL CURETTAGE SURGERY ON 8-23-21      No Known Allergies  No outpatient medications have been marked as taking for the 8/17/21 encounter (Office Visit) with ADALI Luu. This patient presents to the office today for a preoperative consultation at the request of surgeon, Dr. Haley Cannon, who plans on performing colposcopy loop electrosurgical excision procedure, endocervical curettage  on August 23 at [facility]. The current problem began 2 months ago, and symptoms have been unchanged with time. Conservative therapy: N/A. Planned anesthesia: General   Known anesthesia problems: None   Bleeding risk: No recent or remote history of abnormal bleeding  Personal or FH of DVT/PE: No    Patient objection to receiving blood products: No    Patient Active Problem List   Diagnosis    Ganglion cyst of volar aspect of right wrist    Trigger thumb, right thumb       No past medical history on file.   Past Surgical History:   Procedure Laterality Date    FINGER TRIGGER RELEASE Right 8/10/2021    RELEASE OF TRIGGER THUMB RIGHT , PAT WITH ELI IN OFFICE performed by Rebecca Montoya MD at [facility] 8/10/2021    EXCISION OF GANGLION CYST RIGHT WRIST, performed by Anthony Adam MD at [facility]     No family history on file. Social History     Socioeconomic History    Marital status: Unknown     Spouse name: Not on file    Number of children: Not on file    Years of education: Not on file    Highest education level: Not on file   Occupational History    Not on file   Tobacco Use    Smoking status: Current Every Day Smoker     Types: Cigarettes    Smokeless tobacco: Never Used   Substance and Sexual Activity    Alcohol use: Not on file    Drug use: Not on file    Sexual activity: Not on file   Other Topics Concern    Not on file   Social History Narrative    Not on file     Social Determinants of Health     Financial Resource Strain: Low Risk     Difficulty of Paying Living Expenses: Not hard at all   Food Insecurity: No Food Insecurity    Worried About [facility] in the Last Year: Never true    [employer] in the Last Year: Never true   Transportation Needs:     Lack of Transportation (Medical):  Lack of Transportation (Non-Medical):    Physical Activity:     Days of Exercise per Week:     Minutes of Exercise per Session:    Stress:     Feeling of Stress :    Social Connections:     Frequency of Communication with Friends and Family:     Frequency of Social Gatherings with Friends and Family:     Attends Hoah[employer]ism Services:     Active Member of Clubs or Organizations:     Attends Club or Organization Meetings:     Marital Status:    Intimate Partner Violence:     Fear of Current or Ex-Partner:     Emotionally Abused:     Physically Abused:     Sexually Abused:        Review of Systems  A comprehensive review of systems was negative except for what was noted in the HPI. Physical Exam   Constitutional: She is oriented to person, place, and time. She appears well-developed and well-nourished. No distress. HENT:   Head: Normocephalic and atraumatic.    Mouth/Throat: Uvula is midline, oropharynx is clear and moist and mucous membranes are normal.   Eyes: Conjunctivae and EOM are normal. Pupils are equal, round, and reactive to light. Neck: Trachea normal and normal range of motion. Neck supple. No JVD present. Carotid bruit is not present. No mass and no thyromegaly present. Cardiovascular: Normal rate, regular rhythm, normal heart sounds and intact distal pulses. Exam reveals no gallop and no friction rub. No murmur heard. Pulmonary/Chest: Effort normal and breath sounds normal. No respiratory distress. She has no wheezes. She has no rales. Abdominal: Soft. Normal aorta and bowel sounds are normal. She exhibits no distension and no mass. There is no hepatosplenomegaly. No tenderness. Musculoskeletal: She exhibits no edema and no tenderness. Neurological: She is alert and oriented to person, place, and time. She has normal strength. No cranial nerve deficit or sensory deficit. Coordination and gait normal.   Skin: Skin is warm and dry. No rash noted. No erythema. Psychiatric: She has a normal mood and affect. Her behavior is normal.     EKG Interpretation:  normal EKG, normal sinus rhythm, unchanged from previous tracings. Lab Review   Orders Only on 08/09/2021   Component Date Value    WBC 08/09/2021 6.1     RBC 08/09/2021 4.46     Hemoglobin 08/09/2021 14.5     Hematocrit 08/09/2021 42.7     MCV 08/09/2021 95.8     MCH 08/09/2021 32.5*    MCHC 08/09/2021 33.9     RDW 08/09/2021 13.6     Platelets 26/82/9860 205     Sodium 08/09/2021 138     Potassium 08/09/2021 4.5     Chloride 08/09/2021 102     CO2 08/09/2021 28     Anion Gap 08/09/2021 8*    Glucose 08/09/2021 137*    BUN 08/09/2021 10     CREATININE 08/09/2021 0.59     GFR Non- 08/09/2021 >60.0     GFR  08/09/2021 >60.0     Calcium 08/09/2021 8.8     HCG(Urine) Pregnancy Test 08/09/2021 Negative            Assessment:       42 y.o. patient with planned surgery as above.     Known risk factors for perioperative complications: None  Current medications which may produce withdrawal symptoms if withheld perioperatively: none      Plan:     1. Preoperative workup as follows: none  2. Change in medication regimen before surgery: None  3. Prophylaxis for cardiac events with perioperative beta-blockers: Not indicated  ACC/AHA indications for pre-operative beta-blocker use:    · Vascular surgery with history of postitive stress test  · Intermediate or high risk surgery with history of CAD   · Intermediate or high risk surgery with multiple clinical predictors of CAD- 2 of the following: history of compensated or prior heart failure, history of cerebrovascular disease, DM, or renal insufficiency    Routine administration of higher-dose, long-acting metoprolol in beta-blockernaïve patients on the day of surgery, and in the absence of dose titration is associated with an overall increase in mortality. Beta-blockers should be started days to weeks prior to surgery and titrated to pulse < 70.  4. Deep vein thrombosis prophylaxis: regimen to be chosen by surgical team  5.  Please review above information for surgical clearance

## 2021-08-23 ENCOUNTER — ANESTHESIA EVENT (OUTPATIENT)
Dept: OPERATING ROOM | Age: 46
End: 2021-08-23
Payer: COMMERCIAL

## 2021-08-23 ENCOUNTER — HOSPITAL ENCOUNTER (OUTPATIENT)
Age: 46
Setting detail: OUTPATIENT SURGERY
Discharge: HOME | End: 2021-08-23
Attending: OBSTETRICS & GYNECOLOGY | Admitting: OBSTETRICS & GYNECOLOGY
Payer: COMMERCIAL

## 2021-08-23 ENCOUNTER — ANESTHESIA (OUTPATIENT)
Dept: OPERATING ROOM | Age: 46
End: 2021-08-23
Payer: COMMERCIAL

## 2021-08-23 VITALS
HEART RATE: 64 BPM | DIASTOLIC BLOOD PRESSURE: 68 MMHG | RESPIRATION RATE: 16 BRPM | SYSTOLIC BLOOD PRESSURE: 104 MMHG | TEMPERATURE: 97.6 F | WEIGHT: 186 LBS | BODY MASS INDEX: 31.76 KG/M2 | HEIGHT: 64 IN | OXYGEN SATURATION: 98 %

## 2021-08-23 VITALS — TEMPERATURE: 95.2 F | DIASTOLIC BLOOD PRESSURE: 56 MMHG | SYSTOLIC BLOOD PRESSURE: 96 MMHG | OXYGEN SATURATION: 91 %

## 2021-08-23 DIAGNOSIS — G89.18 POSTOPERATIVE PAIN: Primary | ICD-10-CM

## 2021-08-23 LAB
HCG, URINE, POC: NEGATIVE
Lab: NORMAL
NEGATIVE QC PASS/FAIL: NORMAL
POSITIVE QC PASS/FAIL: NORMAL

## 2021-08-23 PROCEDURE — 6360000002 HC RX W HCPCS: Performed by: NURSE ANESTHETIST, CERTIFIED REGISTERED

## 2021-08-23 PROCEDURE — 57522 CONIZATION OF CERVIX: CPT | Performed by: OBSTETRICS & GYNECOLOGY

## 2021-08-23 PROCEDURE — 2580000003 HC RX 258: Performed by: ANESTHESIOLOGY

## 2021-08-23 PROCEDURE — 6370000000 HC RX 637 (ALT 250 FOR IP)

## 2021-08-23 PROCEDURE — 3700000000 HC ANESTHESIA ATTENDED CARE: Performed by: OBSTETRICS & GYNECOLOGY

## 2021-08-23 PROCEDURE — 7100000011 HC PHASE II RECOVERY - ADDTL 15 MIN: Performed by: OBSTETRICS & GYNECOLOGY

## 2021-08-23 PROCEDURE — 3700000001 HC ADD 15 MINUTES (ANESTHESIA): Performed by: OBSTETRICS & GYNECOLOGY

## 2021-08-23 PROCEDURE — 88307 TISSUE EXAM BY PATHOLOGIST: CPT

## 2021-08-23 PROCEDURE — 2709999900 HC NON-CHARGEABLE SUPPLY: Performed by: OBSTETRICS & GYNECOLOGY

## 2021-08-23 PROCEDURE — 7100000010 HC PHASE II RECOVERY - FIRST 15 MIN: Performed by: OBSTETRICS & GYNECOLOGY

## 2021-08-23 PROCEDURE — 7100000000 HC PACU RECOVERY - FIRST 15 MIN: Performed by: OBSTETRICS & GYNECOLOGY

## 2021-08-23 PROCEDURE — 6370000000 HC RX 637 (ALT 250 FOR IP): Performed by: OBSTETRICS & GYNECOLOGY

## 2021-08-23 PROCEDURE — 88305 TISSUE EXAM BY PATHOLOGIST: CPT

## 2021-08-23 PROCEDURE — 3600000013 HC SURGERY LEVEL 3 ADDTL 15MIN: Performed by: OBSTETRICS & GYNECOLOGY

## 2021-08-23 PROCEDURE — 2580000003 HC RX 258: Performed by: OBSTETRICS & GYNECOLOGY

## 2021-08-23 PROCEDURE — 2580000003 HC RX 258: Performed by: NURSE ANESTHETIST, CERTIFIED REGISTERED

## 2021-08-23 PROCEDURE — 88342 IMHCHEM/IMCYTCHM 1ST ANTB: CPT

## 2021-08-23 PROCEDURE — 6360000002 HC RX W HCPCS: Performed by: OBSTETRICS & GYNECOLOGY

## 2021-08-23 PROCEDURE — 7100000001 HC PACU RECOVERY - ADDTL 15 MIN: Performed by: OBSTETRICS & GYNECOLOGY

## 2021-08-23 PROCEDURE — 3600000003 HC SURGERY LEVEL 3 BASE: Performed by: OBSTETRICS & GYNECOLOGY

## 2021-08-23 RX ORDER — IBUPROFEN 400 MG/1
400 TABLET ORAL EVERY 6 HOURS PRN
Status: CANCELLED | OUTPATIENT
Start: 2021-08-23

## 2021-08-23 RX ORDER — SODIUM CHLORIDE, SODIUM LACTATE, POTASSIUM CHLORIDE, CALCIUM CHLORIDE 600; 310; 30; 20 MG/100ML; MG/100ML; MG/100ML; MG/100ML
INJECTION, SOLUTION INTRAVENOUS CONTINUOUS PRN
Status: DISCONTINUED | OUTPATIENT
Start: 2021-08-23 | End: 2021-08-23

## 2021-08-23 RX ORDER — PROPOFOL 10 MG/ML
INJECTION, EMULSION INTRAVENOUS PRN
Status: DISCONTINUED | OUTPATIENT
Start: 2021-08-23 | End: 2021-08-23 | Stop reason: SDUPTHER

## 2021-08-23 RX ORDER — ONDANSETRON 2 MG/ML
4 INJECTION INTRAMUSCULAR; INTRAVENOUS
Status: DISCONTINUED | OUTPATIENT
Start: 2021-08-23 | End: 2021-08-23 | Stop reason: HOSPADM

## 2021-08-23 RX ORDER — HYDROCODONE BITARTRATE AND ACETAMINOPHEN 5; 325 MG/1; MG/1
1 TABLET ORAL PRN
Status: DISCONTINUED | OUTPATIENT
Start: 2021-08-23 | End: 2021-08-23 | Stop reason: HOSPADM

## 2021-08-23 RX ORDER — MAGNESIUM HYDROXIDE 1200 MG/15ML
LIQUID ORAL CONTINUOUS PRN
Status: COMPLETED | OUTPATIENT
Start: 2021-08-23 | End: 2021-08-23

## 2021-08-23 RX ORDER — ONDANSETRON 2 MG/ML
INJECTION INTRAMUSCULAR; INTRAVENOUS PRN
Status: DISCONTINUED | OUTPATIENT
Start: 2021-08-23 | End: 2021-08-23 | Stop reason: SDUPTHER

## 2021-08-23 RX ORDER — FENTANYL CITRATE 50 UG/ML
INJECTION, SOLUTION INTRAMUSCULAR; INTRAVENOUS PRN
Status: DISCONTINUED | OUTPATIENT
Start: 2021-08-23 | End: 2021-08-23 | Stop reason: SDUPTHER

## 2021-08-23 RX ORDER — SODIUM CHLORIDE 0.9 % (FLUSH) 0.9 %
5-40 SYRINGE (ML) INJECTION EVERY 12 HOURS SCHEDULED
Status: CANCELLED | OUTPATIENT
Start: 2021-08-23

## 2021-08-23 RX ORDER — DIPHENHYDRAMINE HYDROCHLORIDE 50 MG/ML
12.5 INJECTION INTRAMUSCULAR; INTRAVENOUS
Status: DISCONTINUED | OUTPATIENT
Start: 2021-08-23 | End: 2021-08-23 | Stop reason: HOSPADM

## 2021-08-23 RX ORDER — ONDANSETRON 4 MG/1
4 TABLET, ORALLY DISINTEGRATING ORAL EVERY 8 HOURS PRN
Status: CANCELLED | OUTPATIENT
Start: 2021-08-23

## 2021-08-23 RX ORDER — SODIUM CHLORIDE, SODIUM LACTATE, POTASSIUM CHLORIDE, CALCIUM CHLORIDE 600; 310; 30; 20 MG/100ML; MG/100ML; MG/100ML; MG/100ML
INJECTION, SOLUTION INTRAVENOUS CONTINUOUS PRN
Status: DISCONTINUED | OUTPATIENT
Start: 2021-08-23 | End: 2021-08-23 | Stop reason: SDUPTHER

## 2021-08-23 RX ORDER — DEXAMETHASONE SODIUM PHOSPHATE 4 MG/ML
INJECTION, SOLUTION INTRA-ARTICULAR; INTRALESIONAL; INTRAMUSCULAR; INTRAVENOUS; SOFT TISSUE PRN
Status: DISCONTINUED | OUTPATIENT
Start: 2021-08-23 | End: 2021-08-23 | Stop reason: SDUPTHER

## 2021-08-23 RX ORDER — SODIUM CHLORIDE 9 MG/ML
25 INJECTION, SOLUTION INTRAVENOUS PRN
Status: CANCELLED | OUTPATIENT
Start: 2021-08-23

## 2021-08-23 RX ORDER — LIDOCAINE HYDROCHLORIDE 20 MG/ML
INJECTION, SOLUTION INTRAVENOUS PRN
Status: DISCONTINUED | OUTPATIENT
Start: 2021-08-23 | End: 2021-08-23 | Stop reason: SDUPTHER

## 2021-08-23 RX ORDER — SODIUM CHLORIDE, SODIUM LACTATE, POTASSIUM CHLORIDE, CALCIUM CHLORIDE 600; 310; 30; 20 MG/100ML; MG/100ML; MG/100ML; MG/100ML
INJECTION, SOLUTION INTRAVENOUS CONTINUOUS
Status: CANCELLED | OUTPATIENT
Start: 2021-08-23

## 2021-08-23 RX ORDER — SODIUM CHLORIDE, SODIUM LACTATE, POTASSIUM CHLORIDE, CALCIUM CHLORIDE 600; 310; 30; 20 MG/100ML; MG/100ML; MG/100ML; MG/100ML
INJECTION, SOLUTION INTRAVENOUS CONTINUOUS
Status: DISCONTINUED | OUTPATIENT
Start: 2021-08-23 | End: 2021-08-23 | Stop reason: HOSPADM

## 2021-08-23 RX ORDER — MIDAZOLAM HYDROCHLORIDE 2 MG/2ML
INJECTION, SOLUTION INTRAMUSCULAR; INTRAVENOUS PRN
Status: DISCONTINUED | OUTPATIENT
Start: 2021-08-23 | End: 2021-08-23 | Stop reason: SDUPTHER

## 2021-08-23 RX ORDER — METOCLOPRAMIDE HYDROCHLORIDE 5 MG/ML
10 INJECTION INTRAMUSCULAR; INTRAVENOUS
Status: DISCONTINUED | OUTPATIENT
Start: 2021-08-23 | End: 2021-08-23 | Stop reason: HOSPADM

## 2021-08-23 RX ORDER — FENTANYL CITRATE 50 UG/ML
50 INJECTION, SOLUTION INTRAMUSCULAR; INTRAVENOUS EVERY 10 MIN PRN
Status: DISCONTINUED | OUTPATIENT
Start: 2021-08-23 | End: 2021-08-23 | Stop reason: HOSPADM

## 2021-08-23 RX ORDER — ONDANSETRON 2 MG/ML
4 INJECTION INTRAMUSCULAR; INTRAVENOUS EVERY 6 HOURS PRN
Status: CANCELLED | OUTPATIENT
Start: 2021-08-23

## 2021-08-23 RX ORDER — SODIUM CHLORIDE 0.9 % (FLUSH) 0.9 %
5-40 SYRINGE (ML) INJECTION PRN
Status: CANCELLED | OUTPATIENT
Start: 2021-08-23

## 2021-08-23 RX ORDER — HYDROCODONE BITARTRATE AND ACETAMINOPHEN 5; 325 MG/1; MG/1
2 TABLET ORAL PRN
Status: DISCONTINUED | OUTPATIENT
Start: 2021-08-23 | End: 2021-08-23 | Stop reason: HOSPADM

## 2021-08-23 RX ORDER — MEPERIDINE HYDROCHLORIDE 25 MG/ML
12.5 INJECTION INTRAMUSCULAR; INTRAVENOUS; SUBCUTANEOUS EVERY 5 MIN PRN
Status: DISCONTINUED | OUTPATIENT
Start: 2021-08-23 | End: 2021-08-23 | Stop reason: HOSPADM

## 2021-08-23 RX ORDER — HYDROCODONE BITARTRATE AND ACETAMINOPHEN 5; 325 MG/1; MG/1
1 TABLET ORAL EVERY 6 HOURS PRN
Qty: 12 TABLET | Refills: 0 | Status: SHIPPED | OUTPATIENT
Start: 2021-08-23 | End: 2021-08-26

## 2021-08-23 RX ORDER — ACETIC ACID 5 %
LIQUID (ML) MISCELLANEOUS PRN
Status: DISCONTINUED | OUTPATIENT
Start: 2021-08-23 | End: 2021-08-23 | Stop reason: ALTCHOICE

## 2021-08-23 RX ORDER — KETOROLAC TROMETHAMINE 30 MG/ML
30 INJECTION, SOLUTION INTRAMUSCULAR; INTRAVENOUS EVERY 6 HOURS
Status: CANCELLED | OUTPATIENT
Start: 2021-08-23 | End: 2021-08-25

## 2021-08-23 RX ADMIN — PROPOFOL 200 MG: 10 INJECTION, EMULSION INTRAVENOUS at 12:06

## 2021-08-23 RX ADMIN — DEXAMETHASONE SODIUM PHOSPHATE 4 MG: 4 INJECTION, SOLUTION INTRAMUSCULAR; INTRAVENOUS at 12:02

## 2021-08-23 RX ADMIN — MIDAZOLAM HYDROCHLORIDE 2 MG: 1 INJECTION, SOLUTION INTRAMUSCULAR; INTRAVENOUS at 12:03

## 2021-08-23 RX ADMIN — ONDANSETRON 4 MG: 2 INJECTION INTRAMUSCULAR; INTRAVENOUS at 12:02

## 2021-08-23 RX ADMIN — LIDOCAINE HYDROCHLORIDE 80 MG: 20 INJECTION, SOLUTION INTRAVENOUS at 12:06

## 2021-08-23 RX ADMIN — SODIUM CHLORIDE, POTASSIUM CHLORIDE, SODIUM LACTATE AND CALCIUM CHLORIDE: 600; 310; 30; 20 INJECTION, SOLUTION INTRAVENOUS at 11:35

## 2021-08-23 RX ADMIN — CEFAZOLIN SODIUM 2000 MG: 10 INJECTION, POWDER, FOR SOLUTION INTRAVENOUS at 12:15

## 2021-08-23 RX ADMIN — FENTANYL CITRATE 50 MCG: 50 INJECTION, SOLUTION INTRAMUSCULAR; INTRAVENOUS at 12:16

## 2021-08-23 RX ADMIN — SODIUM CHLORIDE, POTASSIUM CHLORIDE, SODIUM LACTATE AND CALCIUM CHLORIDE: 600; 310; 30; 20 INJECTION, SOLUTION INTRAVENOUS at 10:39

## 2021-08-23 RX ADMIN — FENTANYL CITRATE 50 MCG: 50 INJECTION, SOLUTION INTRAMUSCULAR; INTRAVENOUS at 12:26

## 2021-08-23 ASSESSMENT — PULMONARY FUNCTION TESTS
PIF_VALUE: 1
PIF_VALUE: 2
PIF_VALUE: 17
PIF_VALUE: 1
PIF_VALUE: 6
PIF_VALUE: 0
PIF_VALUE: 2
PIF_VALUE: 1
PIF_VALUE: 2
PIF_VALUE: 0
PIF_VALUE: 2
PIF_VALUE: 22
PIF_VALUE: 2
PIF_VALUE: 2
PIF_VALUE: 22
PIF_VALUE: 2
PIF_VALUE: 1
PIF_VALUE: 1
PIF_VALUE: 2

## 2021-08-23 ASSESSMENT — LIFESTYLE VARIABLES: SMOKING_STATUS: 1

## 2021-08-23 ASSESSMENT — PAIN - FUNCTIONAL ASSESSMENT: PAIN_FUNCTIONAL_ASSESSMENT: 0-10

## 2021-08-23 NOTE — OP NOTE
PATIENT NAME: Marisa Rosas  MEDICAL RECORD NO. [id number]  SURGEON: Sandip Hyman  Primary Care Physician: Sally Izaguirre DO     PROCEDURE PERFORMED:  8/23/2021  PREOPERATIVE DIAGNOSIS: PAULA-2  POSTOPERATIVE DIAGNOSIS: Same   PROCEDURE PERFORMED: Colposcopy LEEP of the cervix ECC  SURGEON:  Dr. Iris Sandoval  ANESTHESIA:  GET  ESTIMATED BLOOD LOSS: Minimal ml  SPECIMEN: Ectocervical LEEP specimen endocervical LEEP specimen and an ECC  COMPLICATIONS:  None immediately appreciated. DISCUSSION: Cary Macdonald is a 42y.o. year old female who has high-grade cervical intraepithelial dysplasia. ECC was negative. After history and physical examination was performed, potential diagnostic and therapeutic modalities discussed with the patient. She was given opportunity to ask questions. Once answered informed consent was obtained. She was brought to operating room on 8/23/2021 for procedure. PROCEDURE: Patient to be taken to the operating room general anesthesia is performed. Patient then placed in the dorsal 5 position prepped and draped in usual fashion the bladder was drained timeout was called Examiner anesthesia revealed a small anteverted mobile uterus adnexa is negative for mass cervix 6 is exposed with a bivalve speculum cast is applied. Abnormality is identified. The cervix was then injected with Marcaine after the area to be removed is marked circumferentially. The LEEP instrument is placed at [de-identified] W pure cut and the specimens are taken without difficulty there is an ectocervical LEEP specimen followed by an endocervical LEEP specimen and ECC. The edges of the cervix were then coagulated with bipolar cautery and then Monsel's was applied to the base of the cervix. Patient emerged from anesthesia taken to the PACU in good condition    OPERATIVE FINDINGS: Lesion seen posterior cervix         Postoperative findings were discussed with the patient's family.   She was given discharge instructions, prescriptions for analgesics. She will follow up in my office in a for reevaluation. Electronically signed by Jesus Martin DO on 8/23/21. Please note this report has been partially produced using speech recognition software and may cause contain errors related to that system including grammar, punctuation and spelling as well as words and phrases that may seem inappropriate. If there are questions or concerns please feel free to contact me to clarify.

## 2021-08-23 NOTE — ANESTHESIA POSTPROCEDURE EVALUATION
Department of Anesthesiology  Postprocedure Note    Patient: Luís Dunaway  MRN: [id number]  YOB: 1975  Date of evaluation: 8/23/2021  Time:  12:37 PM     Procedure Summary     Date: 08/23/21 Room / Location: [address] [place] [address]    Anesthesia Start: 3132 Anesthesia Stop: 1237    Procedures:       COLPOSCOPY (N/A )      LOOP ELECTROSURGICAL EXCISION PROCEDURE ENDOCERVICAL CURETTAGE (N/A ) Diagnosis: (MODERATE CERVICAL  DYSPLASIA)    Surgeons: Jesus Martin DO Responsible Provider: Anamaria Childers MD    Anesthesia Type: general ASA Status: 2          Anesthesia Type: general    Kylah Phase I: Kylah Score: 10    Kylah Phase II:      Last vitals: Reviewed and per EMR flowsheets.        Anesthesia Post Evaluation    Patient location during evaluation: PACU  Patient participation: complete - patient participated  Level of consciousness: awake  Pain score: 0  Airway patency: patent  Nausea & Vomiting: no nausea and no vomiting  Complications: no  Cardiovascular status: blood pressure returned to baseline and hemodynamically stable  Respiratory status: acceptable and nasal cannula  Hydration status: stable

## 2021-08-23 NOTE — BRIEF OP NOTE
Brief Postoperative Note      Patient: Moi Sanchez  YOB: 1975  MRN: [id number]    Date of Procedure: 8/23/2021    Pre-Op Diagnosis: MODERATE CERVICAL  DYSPLASIA    Post-Op Diagnosis: Same       Procedure(s):  COLPOSCOPY  LOOP ELECTROSURGICAL EXCISION PROCEDURE ENDOCERVICAL CURETTAGE    Surgeon(s):  Remedios Mccollum DO    Assistant:  * No surgical staff found *    Anesthesia: General    Estimated Blood Loss (mL): Minimal    Complications: None    Specimens:   ID Type Source Tests Collected by Time Destination   A : ECC Tissue Cervix SURGICAL PATHOLOGY Remedios Mccollum DO 8/23/2021 1227    B : ENDO CERVICAL BX Tissue Cervix SURGICAL PATHOLOGY Remedios Mccollum DO 8/23/2021 1225    C : ECTO CERVICAL LEEP BX Tissue Cervix SURGICAL PATHOLOGY Remedios Mccollum DO 8/23/2021 1221        Implants:  * No implants in log *      Drains:   [REMOVED] Urethral Catheter Straight-tip 14 fr (Removed)       Findings: PAULA 2    Electronically signed by Remedios Mccollum DO on 8/23/2021 at 12:28 PM

## 2021-08-23 NOTE — ANESTHESIA PRE PROCEDURE
Department of Anesthesiology  Preprocedure Note       Name:  Mary Watters   Age:  42 y.o.  :  1975                                          MRN:  [id number]         Date:  2021      Surgeon: Semaj Patel):  Shashank Greene DO    Procedure: Procedure(s):  COLPOSCOPY  LOOP ELECTROSURGICAL EXCISION PROCEDURE ENDOCERVICAL CURETTAGE, PAT AT [facility], [place] SPEAKING    Medications prior to admission:   Prior to Admission medications    Medication Sig Start Date End Date Taking? Authorizing Provider   meloxicam (MOBIC) 15 MG tablet take 1 tablet by mouth once daily 21  Yes Historical Provider, MD       Current medications:    Current Facility-Administered Medications   Medication Dose Route Frequency Provider Last Rate Last Admin    lactated ringers infusion   Intravenous Continuous Brenda Richmond  mL/hr at 21 1039 New Bag at 21 1039    ceFAZolin (ANCEF) 2000 mg in dextrose 5 % 100 mL IVPB  2,000 mg Intravenous On Call to DO Shirlene           Allergies:  No Known Allergies    Problem List:    Patient Active Problem List   Diagnosis Code    Ganglion cyst of volar aspect of right wrist M67.431    Trigger thumb, right thumb M65.311       Past Medical History:  History reviewed. No pertinent past medical history.     Past Surgical History:        Procedure Laterality Date    FINGER TRIGGER RELEASE Right 8/10/2021    RELEASE OF TRIGGER THUMB RIGHT , PAT WITH ELI IN OFFICE performed by Sanjuan Cockayne, MD at [address] [place] [address] Right 8/10/2021    EXCISION OF GANGLION CYST RIGHT WRIST, performed by Sanjuan Cockayne, MD at [place] [address] History:    Social History     Tobacco Use    Smoking status: Current Every Day Smoker     Types: Cigarettes    Smokeless tobacco: Never Used   Substance Use Topics    Alcohol use: Not on file                                Ready to quit: Not Answered  Counseling given: Not Answered      Vital Signs (Current):   Vitals:    08/23/21 0950   BP: 103/68   Pulse: 75   Resp: 18   Temp: 97.1 °F (36.2 °C)   TempSrc: Temporal   SpO2: 99%   Weight: 186 lb (84.4 kg)   Height: 5' 4\" (1.626 m)                                              BP Readings from Last 3 Encounters:   08/23/21 103/68   08/17/21 118/76   08/10/21 120/77       NPO Status: Time of last liquid consumption: 2200                        Time of last solid consumption: 2200                        Date of last liquid consumption: 08/22/21                        Date of last solid food consumption: 08/22/21    BMI:   Wt Readings from Last 3 Encounters:   08/23/21 186 lb (84.4 kg)   08/17/21 186 lb (84.4 kg)   08/10/21 186 lb (84.4 kg)     Body mass index is 31.93 kg/m². CBC:   Lab Results   Component Value Date    WBC 6.1 08/09/2021    RBC 4.46 08/09/2021    HGB 14.5 08/09/2021    HCT 42.7 08/09/2021    MCV 95.8 08/09/2021    RDW 13.6 08/09/2021     08/09/2021       CMP:   Lab Results   Component Value Date     08/09/2021    K 4.5 08/09/2021     08/09/2021    CO2 28 08/09/2021    BUN 10 08/09/2021    CREATININE 0.59 08/09/2021    GFRAA >60.0 08/09/2021    LABGLOM >60.0 08/09/2021    GLUCOSE 137 08/09/2021    CALCIUM 8.8 08/09/2021       POC Tests: No results for input(s): POCGLU, POCNA, POCK, POCCL, POCBUN, POCHEMO, POCHCT in the last 72 hours.     Coags: No results found for: PROTIME, INR, APTT    HCG (If Applicable):   Lab Results   Component Value Date    PREGTESTUR Negative 08/09/2021        ABGs: No results found for: PHART, PO2ART, BOK1WGN, PNH3OUU, BEART, R7PSIQVW     Type & Screen (If Applicable):  No results found for: LABABO, LABRH    Drug/Infectious Status (If Applicable):  No results found for: HIV, HEPCAB    COVID-19 Screening (If Applicable): No results found for: COVID19        Anesthesia Evaluation  Patient summary reviewed and Nursing notes reviewed no history of anesthetic complications:   Airway: Mallampati: II  TM distance: >3 FB   Neck ROM: full  Mouth opening: > = 3 FB Dental: normal exam         Pulmonary:   (+) current smoker                           Cardiovascular:Negative CV ROS             Beta Blocker:  Not on Beta Blocker         Neuro/Psych:   Negative Neuro/Psych ROS              GI/Hepatic/Renal:   (+) morbid obesity (class I obesity)          Endo/Other: Negative Endo/Other ROS                    Abdominal:             Vascular: negative vascular ROS. Other Findings:             Anesthesia Plan      general     ASA 2           MIPS: Postoperative opioids intended and Prophylactic antiemetics administered. Anesthetic plan and risks discussed with patient. Plan discussed with CRNA.     Attending anesthesiologist reviewed and agrees with Darnell Butler MD   8/23/2021

## 2021-08-23 NOTE — H&P
Department of Obstetrics and Gynecology   History & Physical    Pt Name: Roberta Jasso  MRN: [id number] Meek #: [de-identified]  YOB: 1975  Estimated Date of Delivery: None noted. HPI: The patient is a 42 y.o.  female  who presents for a LEEP for HGSIL    Allergies: Allergies as of 2021    (No Known Allergies)       Medications:    Current Facility-Administered Medications:     lactated ringers infusion, , Intravenous, Continuous, Saqib Jacques MD, Last Rate: 100 mL/hr at 21 1039, New Bag at 21 1039    ceFAZolin (ANCEF) 2000 mg in dextrose 5 % 100 mL IVPB, 2,000 mg, Intravenous, On Call to OR, Mary Quevedo,     meperidine (DEMEROL) injection 12.5 mg, 12.5 mg, Intravenous, Q5 Min PRN, Saqib Jacques MD    fentaNYL (SUBLIMAZE) injection 50 mcg, 50 mcg, Intravenous, Q10 Min PRN, Saqib Jacques MD    HYDROmorphone (DILAUDID) injection 0.5 mg, 0.5 mg, Intravenous, Q10 Min PRN, Saqib Jacques MD    HYDROcodone-acetaminophen (NORCO) 5-325 MG per tablet 1 tablet, 1 tablet, Oral, PRN **OR** HYDROcodone-acetaminophen (NORCO) 5-325 MG per tablet 2 tablet, 2 tablet, Oral, PRN, Saqib Jacques MD    ondansetron [facility]) injection 4 mg, 4 mg, Intravenous, Once PRN, Saqib Jacques MD    metoclopramide (REGLAN) injection 10 mg, 10 mg, Intravenous, Once PRN, Saqib Jacques MD    diphenhydrAMINE (BENADRYL) injection 12.5 mg, 12.5 mg, Intravenous, Once PRN, Saqib Jacques MD    OB History: [id number]    Gyn History: Denies h/o abnormal pap smear, h/o STDs. Past Medical History: History reviewed. No pertinent past medical history.     Past Surgical History:   Past Surgical History:   Procedure Laterality Date    FINGER TRIGGER RELEASE Right 8/10/2021    RELEASE OF TRIGGER THUMB RIGHT , PAT WITH ELI IN OFFICE performed by Anthony Adam MD at [address] [place] [address] Right 8/10/2021    EXCISION OF GANGLION CYST RIGHT WRIST, performed by Amadou Rocio Reddy MD at [facility] History:   Social History     Tobacco Use   Smoking Status Current Every Day Smoker    Types: Cigarettes   Smokeless Tobacco Never Used        Family History: Noncontributory; Denies h/o cancer. ROS:  Negative except as stated in HPI, denies nausea, vomiting, fever, chills, headache or dysuria. PE:  Vitals:    21 0950   BP: 103/68   Pulse: 75   Resp: 18   Temp: 97.1 °F (36.2 °C)   SpO2: 99%       General: well nourished, well developed, in no acute distress  CV: Normal heart sounds  Resp: breathing unlabored  Abdomen: Nontender, no rebound, no guarding  FH:  Cx:    NST - Cat 1: FHR 140s, moderate variability, +accels, -decels    Labs:       Assessment:   42 y.o.   female with HGSIL    Plan: colpo NASIMP [address], DO

## 2021-08-24 ENCOUNTER — OFFICE VISIT (OUTPATIENT)
Dept: ORTHOPEDIC SURGERY | Age: 46
End: 2021-08-24
Payer: COMMERCIAL

## 2021-08-24 VITALS
BODY MASS INDEX: 31.76 KG/M2 | TEMPERATURE: 98 F | HEIGHT: 64 IN | OXYGEN SATURATION: 96 % | WEIGHT: 186 LBS | HEART RATE: 95 BPM | RESPIRATION RATE: 16 BRPM

## 2021-08-24 DIAGNOSIS — M67.431 GANGLION CYST OF VOLAR ASPECT OF RIGHT WRIST: Primary | ICD-10-CM

## 2021-08-24 DIAGNOSIS — M65.311 TRIGGER THUMB OF RIGHT HAND: ICD-10-CM

## 2021-08-24 PROCEDURE — 99024 POSTOP FOLLOW-UP VISIT: CPT | Performed by: PHYSICIAN ASSISTANT

## 2021-08-24 PROCEDURE — L3908 WHO COCK-UP NONMOLDE PRE OTS: HCPCS | Performed by: PHYSICIAN ASSISTANT

## 2021-08-24 NOTE — LETTER
[address] [address]  Phone: [phone]  Fax: [phone]    Lucero Colon PA-C        August 24, 2021     Patient: Mary Watters   YOB: 1975   Date of Visit: 8/24/2021       To Whom It May Concern: It is my medical opinion that Mary Watters should remain out of work until 09/06/2021. If you have any questions or concerns, please don't hesitate to call.     Sincerely,        Lucero Colon PA-C

## 2021-08-24 NOTE — PROGRESS NOTES
[place] [id number] [facility]      Subjective:      Chief Complaint   Patient presents with    Post-Op Check     S/p EXCISION OF GANGLION CYST RIGHT WRIST, - Right with Gerson Joya MD on 8/10/2021 . Pt denies any issues since the procedure. HPI: Florentin Monroe is a 42 y.o. female who is here 2 weeks postop cyst removal  She has intermediate relief of her symptoms since surgery. incisions are great.  sutures were removed. No past medical history on file. Past Surgical History:   Procedure Laterality Date    FINGER TRIGGER RELEASE Right 8/10/2021    RELEASE OF TRIGGER THUMB RIGHT , PAT WITH ELI IN OFFICE performed by Gerson Joya MD at [address] Right 8/10/2021    EXCISION OF GANGLION CYST RIGHT WRIST, performed by Gerson Joya MD at [address] [place] [address] History     Socioeconomic History    Marital status: Unknown     Spouse name: Not on file    Number of children: Not on file    Years of education: Not on file    Highest education level: Not on file   Occupational History    Not on file   Tobacco Use    Smoking status: Current Every Day Smoker     Types: Cigarettes    Smokeless tobacco: Never Used   Substance and Sexual Activity    Alcohol use: Not on file    Drug use: Not on file    Sexual activity: Not on file   Other Topics Concern    Not on file   Social History Narrative    Not on file     Social Determinants of Health     Financial Resource Strain: Low Risk     Difficulty of Paying Living Expenses: Not hard at all   Food Insecurity: No Food Insecurity    Worried About [address] in the Last Year: Never true    [facility] in the Last Year: Never true   Transportation Needs:     Lack of Transportation (Medical):      Lack of Transportation (Non-Medical):    Physical Activity:     Days of Exercise per Week:     Minutes of Exercise per Session:    Stress:     Feeling of Stress :    Social Connections:     Frequency of Communication with Friends and Family:     Frequency of Social Gatherings with Friends and Family:     Attends [employer] Services:     Active Member of Clubs or Organizations:     Attends Club or Organization Meetings:     Marital Status:    Intimate Partner Violence:     Fear of Current or Ex-Partner:     Emotionally Abused:     Physically Abused:     Sexually Abused:      No family history on file. No Known Allergies  Current Outpatient Medications on File Prior to Visit   Medication Sig Dispense Refill    HYDROcodone-acetaminophen (NORCO) 5-325 MG per tablet Take 1 tablet by mouth every 6 hours as needed for Pain for up to 3 days. Intended supply: 3 days. Take lowest dose possible to manage pain 12 tablet 0    meloxicam (MOBIC) 15 MG tablet take 1 tablet by mouth once daily (Patient not taking: Reported on 8/24/2021)       No current facility-administered medications on file prior to visit. Objective:   Pulse 95   Temp 98 °F (36.7 °C) (Temporal)   Resp 16   Ht 5' 4\" (1.626 m)   Wt 186 lb (84.4 kg)   SpO2 96%   BMI 31.93 kg/m²     General: WDWN, well appearing, in no distress   Skin: without breakdown, rash, normal in color    Well-healing incisions at the palmar aspect of the hand, no erythema or discharge or signs of infection      Radiographs and Laboratory Studies:       Laboratory Studies:   Lab Results   Component Value Date    WBC 6.1 08/09/2021    HGB 14.5 08/09/2021    HCT 42.7 08/09/2021    MCV 95.8 08/09/2021     08/09/2021     No results found for: SEDRATE  No results found for: CRP    Assessment and Plan:      Diagnosis Orders   1. Ganglion cyst of volar aspect of right wrist     2. Trigger thumb of right hand         Sutures were removed. Incision looks great, no signs of infection. Instructed to continue with range of motion exercises until any residual stiffness is resolved. No bathing for another week, however they can shower.   If they are having any issues or signs of infection, they were instructed to call our office immediately and make a follow-up appointment. The above plan was discussed in thorough detail with Dr. Sandra Milan and the patient. No orders of the defined types were placed in this encounter. No orders of the defined types were placed in this encounter. No follow-ups on file.     Lucero Colon PA-C  [facility]  [phone]

## 2021-09-01 ENCOUNTER — OFFICE VISIT (OUTPATIENT)
Dept: ORTHOPEDIC SURGERY | Age: 46
End: 2021-09-01
Payer: COMMERCIAL

## 2021-09-01 VITALS
OXYGEN SATURATION: 99 % | WEIGHT: 186 LBS | BODY MASS INDEX: 31.76 KG/M2 | HEART RATE: 102 BPM | HEIGHT: 64 IN | TEMPERATURE: 96.9 F

## 2021-09-01 DIAGNOSIS — M65.311 TRIGGER THUMB OF RIGHT HAND: ICD-10-CM

## 2021-09-01 DIAGNOSIS — M67.431 GANGLION CYST OF VOLAR ASPECT OF RIGHT WRIST: Primary | ICD-10-CM

## 2021-09-01 PROCEDURE — 99213 OFFICE O/P EST LOW 20 MIN: CPT | Performed by: PHYSICIAN ASSISTANT

## 2021-09-01 PROCEDURE — 4004F PT TOBACCO SCREEN RCVD TLK: CPT | Performed by: PHYSICIAN ASSISTANT

## 2021-09-01 PROCEDURE — G8417 CALC BMI ABV UP PARAM F/U: HCPCS | Performed by: PHYSICIAN ASSISTANT

## 2021-09-01 PROCEDURE — G8427 DOCREV CUR MEDS BY ELIG CLIN: HCPCS | Performed by: PHYSICIAN ASSISTANT

## 2021-09-01 NOTE — PROGRESS NOTES
[place] [address] [facility]      Subjective:      Chief Complaint   Patient presents with    Follow-up     1 week follow up on Ganglion cyst of volar aspect of right wrist. She is still having discomfort on her wrist.        HPI: Paul Woody is a 42 y.o. female who is here 4 weeks postop ganglion cyst removal, trigger finger release of the first digit. She was having some wound dehiscence. Everything healed up nicely since I last saw her. She has some residual scar tissue under the volar aspect incision. Sometimes to be tender for her. No numbness or tingling. No past medical history on file.    Past Surgical History:   Procedure Laterality Date    COLPOSCOPY N/A 8/23/2021    COLPOSCOPY performed by Bar Hoover DO at [facility] Right 8/10/2021    RELEASE OF TRIGGER THUMB RIGHT , PAT WITH ELI IN OFFICE performed by Rebecca Montoya MD at [facility] N/A 8/23/2021    LOOP ELECTROSURGICAL EXCISION PROCEDURE ENDOCERVICAL CURETTAGE performed by Bar Hoover DO at [address] Right 8/10/2021    EXCISION OF GANGLION CYST RIGHT WRIST, performed by Rebecca Montoya MD at [facility] History     Socioeconomic History    Marital status: Unknown     Spouse name: Not on file    Number of children: Not on file    Years of education: Not on file    Highest education level: Not on file   Occupational History    Not on file   Tobacco Use    Smoking status: Current Every Day Smoker     Types: Cigarettes    Smokeless tobacco: Never Used   Substance and Sexual Activity    Alcohol use: Not on file    Drug use: Not on file    Sexual activity: Not on file   Other Topics Concern    Not on file   Social History Narrative    Not on file     Social Determinants of Health     Financial Resource Strain: Low Risk     Difficulty of Paying Living Expenses: Not hard at all   Food Insecurity: No Food Insecurity    Worried About Running Out of Food in the Last Year: Never true    Ran Out of Food in the Last Year: Never true   Transportation Needs:     Lack of Transportation (Medical):  Lack of Transportation (Non-Medical):    Physical Activity:     Days of Exercise per Week:     Minutes of Exercise per Session:    Stress:     Feeling of Stress :    Social Connections:     Frequency of Communication with Friends and Family:     Frequency of Social Gatherings with Friends and Family:     Attends Denominational Services:     Active Member of Clubs or Organizations:     Attends Club or Organization Meetings:     Marital Status:    Intimate Partner Violence:     Fear of Current or Ex-Partner:     Emotionally Abused:     Physically Abused:     Sexually Abused:      No family history on file. No Known Allergies  Current Outpatient Medications on File Prior to Visit   Medication Sig Dispense Refill    meloxicam (MOBIC) 15 MG tablet take 1 tablet by mouth once daily (Patient not taking: Reported on 8/24/2021)       No current facility-administered medications on file prior to visit. Objective:   Pulse 102   Temp 96.9 °F (36.1 °C) (Temporal)   Ht 5' 4\" (1.626 m)   Wt 186 lb (84.4 kg)   SpO2 99%   BMI 31.93 kg/m²     General: WDWN, well appearing, in no distress   Skin: without breakdown, rash, normal in color    Well-healing incisions at the palmar aspect of the hand, no erythema or discharge or signs of infection      Radiographs and Laboratory Studies:       Laboratory Studies:   Lab Results   Component Value Date    WBC 6.1 08/09/2021    HGB 14.5 08/09/2021    HCT 42.7 08/09/2021    MCV 95.8 08/09/2021     08/09/2021     No results found for: SEDRATE  No results found for: CRP    Assessment and Plan:      Diagnosis Orders   1. Ganglion cyst of volar aspect of right wrist     2.  Trigger thumb of right hand         4 weeks after ganglion cyst removal of the volar aspect of the wrist, first digit trigger release. Had with some wound dehiscence. Everything healed up nicely. No signs of infection. She does have some residual pain and scar tissue formation at the volar aspect incision. She instructed to rub vitamin E over the incision. Work on range of motion exercises. She is all having issues in October she will give us a phone call otherwise she will call us as needed at this point    The above plan was discussed in thorough detail with   and the patient. No orders of the defined types were placed in this encounter. No orders of the defined types were placed in this encounter. No follow-ups on file.     Germania Gonsalves PA-C  [facility]  [phone]

## (undated) DEVICE — YANKAUER,SMOOTH HANDLE,HIGH CAPACITY: Brand: MEDLINE INDUSTRIES, INC.

## (undated) DEVICE — GOWN,AURORA,NONREINFORCED,LARGE: Brand: MEDLINE

## (undated) DEVICE — GAUZE,SPONGE,4"X4",16PLY,XRAY,STRL,LF: Brand: MEDLINE

## (undated) DEVICE — COVER LT HNDL BLU PLAS

## (undated) DEVICE — TOWEL,OR,DSP,ST,BLUE,STD,4/PK,20PK/CS: Brand: MEDLINE

## (undated) DEVICE — COTTON UNDERCAST PADDING,REGULAR FINISH: Brand: WEBRIL

## (undated) DEVICE — E-Z CLEAN, NON-STICK, PTFE COATED, ELECTROSURGICAL BLADE ELECTRODE, 6.5 INCH (16.5 CM): Brand: MEGADYNE

## (undated) DEVICE — SINGLE PORT MANIFOLD: Brand: NEPTUNE 2

## (undated) DEVICE — LABEL MED MINI W/ MARKER

## (undated) DEVICE — DRESSING GZ W1XL8IN COT XRFRM N ADH OVERWRAP CURAD

## (undated) DEVICE — AMD-RITMED SOLON OB/GYN APPLICATORS 8 INCH: Brand: AMD-RITMED SOLON

## (undated) DEVICE — GAUZE,SPONGE,FLUFF,6"X6.75",STRL,10/TRAY: Brand: MEDLINE

## (undated) DEVICE — GOWN,AURORA,NONRNF,XL,30/CS: Brand: MEDLINE

## (undated) DEVICE — ZIMMER® STERILE DISPOSABLE TOURNIQUET CUFF, DUAL PORT, SINGLE BLADDER, 18 IN. (46 CM)

## (undated) DEVICE — NEEDLE SPNL 18GA L3.5IN W/ QNCKE SHARPER BVL DURA CLICK

## (undated) DEVICE — GLOVE ORANGE PI 7 1/2   MSG9075

## (undated) DEVICE — LINER PAD CONTOUR SUPER PEACH 7X14IN

## (undated) DEVICE — TRAY PREP DRY W/ PREM GLV 2 APPL 6 SPNG 2 UNDPD 1 OVERWRAP

## (undated) DEVICE — PACK,SET UP,NO DRAPES: Brand: MEDLINE

## (undated) DEVICE — ELECTRODE PT RET AD L9FT HI MOIST COND ADH HYDRGEL CORDED

## (undated) DEVICE — BANDAGE COMPR W3INXL5YD WHT BGE POLY COT M E WRP WV HK AND

## (undated) DEVICE — HAND II: Brand: MEDLINE INDUSTRIES, INC.

## (undated) DEVICE — BANDAGE COMPR W2INXL5YD WHT BGE POLY COT M E WRP WV HK AND

## (undated) DEVICE — APPLICATOR MEDICATED 26 CC SOLUTION HI LT ORNG CHLORAPREP

## (undated) DEVICE — ELECTRODE ES L W2CM D0.8CM SHFT L12CM CONN L3/32IN TUNGSTEN

## (undated) DEVICE — TUBING, SUCTION, 1/4" X 10', STRAIGHT: Brand: MEDLINE

## (undated) DEVICE — JELLY,LUBE,STERILE,FLIP TOP,TUBE,2-OZ: Brand: MEDLINE

## (undated) DEVICE — GOWN,SIRUS,POLYRNF,BRTHSLV,XLN/XL,20/CS: Brand: MEDLINE

## (undated) DEVICE — SYRINGE MED 10ML LUERLOCK TIP W/O SFTY DISP

## (undated) DEVICE — SPLINT CAST W4XL15IN GRN STRENGTH PLSTR OF PARIS FAST SET

## (undated) DEVICE — SPONGE GZ W4XL4IN COT 12 PLY TYP VII WVN C FLD DSGN

## (undated) DEVICE — PAD,NON-ADHERENT,3X8,STERILE,LF,1/PK: Brand: MEDLINE

## (undated) DEVICE — CATHETER,URETHRAL,VINYL,FEMALE,6",14FR: Brand: MEDLINE

## (undated) DEVICE — PADDING UNDERCAST W4INXL12FT RAYON POLY SYN NONADHESIVE

## (undated) DEVICE — NEPTUNE E-SEP SMOKE EVACUATION PENCIL, COATED, 70MM BLADE, PUSH BUTTON SWITCH: Brand: NEPTUNE E-SEP

## (undated) DEVICE — SUTURE ETHLN SZ 4-0 L18IN NONABSORBABLE BLK L19MM PS-2 3/8 1667H

## (undated) DEVICE — GLOVE ORANGE PI 8   MSG9080

## (undated) DEVICE — LITHOTOMY DRAPE WITH FLUID CONTROL POUCH: Brand: CONVERTORS

## (undated) DEVICE — LLETZ LOOP ELECTRODE, 20MM WIDE X 8MM DEEP, 11.8 CM SHAFT, TAN: Brand: MEGADYNE